# Patient Record
(demographics unavailable — no encounter records)

---

## 2025-05-28 NOTE — PHYSICAL EXAM
[de-identified] : RIGHT  WRIST/HAND/FINGERS  INSPECTION Swelling: Positive Erythema: Positive Deformities/Malalignment: Positive over the distal fifth metacarpal  PALPATION Distal Radius: Negative Ulnar Styloid: Negative Snuffbox: Negative Carpal Bones: Negative Metacarpal Bones: Positive fifth metacarpal CMC Joint: Negative MCP Joints: Negative TFCC: Negative  ROM Wrist Flexion: Normal Wrist Extension: Normal Wrist Radial Deviation: Normal Wrist Ulnar Deviaton: Normal Finger Flexion: Limited Finger Extension: limited  STRENGTH Wrist Extension (C6-C8-ECRL, ECRB, ECU): 5/5 Wrist Flexion (C6-C8-FCR, FCU): 5/5 Finger and Thumb Extension (C7, C8-EDC, EPL): 5/5 Finger and Thumb Flexion: (C7, C8-FDS, FDP, FPL): 5/5 : 5/5 Nerves: AIN, PIN, Ulnar nerve intact  SENSATION Light touch: Intact Capillary refill: Less than two seconds Pulses: Radial pulses 2+ equal bilaterally  LEFT WRIST/HAND/FINGERS Inspection: No swelling, erythema, deformity/malalignment Tenderness: Default Nontender: Distal radius, ulnar styloid, snuffbox, carpal bones, metacarpal bones, CMC joint, MCP joints, TFCC ROM: Full, painless in flexion, extension, radial deviation, ulnar deviation, finger flexion, finger extension Strength: 5/5 wrist extension, wrist flexion, finger and thumb extension, finger and thumb flexion Stability: No significant instability in scapholunate ligament, lunotriquetral ligament, TFCC, distal radial ulnar joint Special test: Negative Finkelstein's, Tinel's, Phalen's, reverse Phalen's, carpal compression test, Magana's test, negative piano key test of the TFCC, negative TFCC grind, painless loaded extension [de-identified] : XR of Date: 5/23/2025 Indication: Right hand pain Views: 3 views-PA, lateral, and oblique Performed at [facility]: Yes  Impression: 3 views of the right hand were obtained and show a displaced fracture of the fifth metacarpal the mid to distal diaphysis with dorsal angulation  The radiographs discussed were ordered and read by me during this patient's visit.  I reviewed each radiograph detail with the patient discussed the findings highlighted in the Impression.

## 2025-05-28 NOTE — HISTORY OF PRESENT ILLNESS
[de-identified] : CC: This is a very pleasant 30-year-old male that presents to the office today as a new patient with right hand pain.  Patient was seen in the [facility] emergency room on 5/23/2025 after apparently hitting his right hand in a hard wall while having a nightmare while sleeping.  He went to the [facility] emergency room where x-rays were taken which showed a fracture of the fifth metacarpal.  He was placed in a makeshift splint and advised to follow-up.  He has been taking Percocet for pain and is down to his last pill.  He states that the pain has become significant.  He is here today for further evaluation.

## 2025-05-28 NOTE — DISCUSSION/SUMMARY
[de-identified] : 30-year-old male presents with a displaced fracture of the fifth metacarpal the mid to distal diaphysis with dorsal angulation that occurred 1 week ago while sleeping. On examination there is swelling with significant pain over the fifth metacarpal.  He is neurovascularly intact at this time.  I explained to the patient that I am a nonoperative sports medicine doctor and that I would like him to have an evaluation with hand surgery for which we will set up for consultation.  In the meantime, our office today does not have the capability to repeat x-rays nor splint so we will provide a generalized wrist brace for added protection that he should wear consistently until he can be seen by my colleague on Friday, 5/30/2025.  This brace covers the area of the fifth metacarpal which is paramount for protection at this time.  We went over risk precautions and diligent use of the hand over the next 2 days until he is seen.  At this time, he would likely be splinted, have repeat x-rays, and discussed the risk/benefits of surgical intervention.  In the meantime, I refilled 2 days of Percocet 5-325 mg every 8 hours as needed for pain control.  Advised we will get his appointment for Friday in [place] where he can be seen for further evaluation by Dr. Kesha Glaser.  ER precautions given for any worsening pain, additional trauma, changes in sensation and swelling.  All questions were answered to satisfaction.  Patient understands and agrees to current plan.  This office visit included some or all of the following of both face-to-face time (preparation for visit-reviewing prior notes, performing H&P, ordering of medications, tests/ performing procedures, and counseling/education to the patient/family) and non-face-to-face time (deciding on recommendations to patients, independently interpreting tests, documentation in the EMR, communicating with other providers before or during the visit).    I have spent a total time of 45 minutes on this patient encounter on the same day of 5/28/2025.

## 2025-05-30 NOTE — ASSESSMENT
[FreeTextEntry1] : Patient comes in after injuring his right hand.  He says that he has night terrors and must of punched something when he was sleeping.  He woke up in an inordinate amount of pain.  He went to an urgent care that placed him to do a splint.  He was told he had a fracture.  Since that time he has been having some more pain.  He has not felt that what he has been in has been immobilizing him well and he has had pain.  He has been taking ibuprofen as needed but not on a regular basis.  He says he has pain at night and takes Percocet to help him sleep.  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  He is right-hand dominant.  He is supposed to be going home to [place] soon to help his family.  He was supposed to go back last week but did not go back secondary to this injury.  Right hand: Swelling on the ulnar aspect of the hand over the fifth metacarpal shaft, tender palpation in the area, mild ecchymosis, decreased range of motion of the fingers secondary to pain, neurovascularly intact  X-rays which were done at the hospital show a volarly angulated fifth metacarpal shaft fracture  The fracture was discussed with the patient at length.  We discussed that although there may be some imperfect alignment on X-ray, the patient would likely do best with fracture fixation. The goal of fixation is to align the bones to an acceptable degree of angulation and rotation.  After surgery, the hand may be splinted in a protected position to keep ligaments on stretch.  Exposed joints should be moved early to maximize functional recovery. Again, the goal is to maximize functional recovery, not to achieve perfect x-rays.  We discussed the general risks of fracture surgery including bleeding, infection, nonunion, malunion, hardware failure, injury to nerves, vessels or tendons, stiffness, pain, CRPS, loss of function, need for additional surgery, loss of limb and risks and complications of anesthesia up to and including loss of life.  We discussed the importance of good function over good X-rays and that performing open surgery with plates and or screws may lead to unnecessary scar tissue formation.  The patient may benefit from therapy.  We discussed non-operative treatment and what to expect with that.  All patient questions were answered. Patient expressed understanding and would like to proceed with the operative treatment plan.  We discussed operative and nonoperative intervention.  The patient would like to move forward with surgical intervention as there is a possibility that a reduction may not hold for the long-term.  We discussed operative and nonoperative treatments.  He would like to move forward with surgery.  I discussed with him possibly a nail versus an ORIF with a plate and screws.  Today the patient was placed into a dorsal and volar slab splint made of plaster.  He felt better without immobilization.  He was also given medication.  He will be booked for surgical intervention hopefully within a week.